# Patient Record
Sex: FEMALE | Race: WHITE
[De-identification: names, ages, dates, MRNs, and addresses within clinical notes are randomized per-mention and may not be internally consistent; named-entity substitution may affect disease eponyms.]

---

## 2018-01-24 ENCOUNTER — HOSPITAL ENCOUNTER (OUTPATIENT)
Dept: HOSPITAL 47 - RADXRMAIN | Age: 48
Discharge: HOME | End: 2018-01-24
Payer: COMMERCIAL

## 2018-01-24 DIAGNOSIS — M25.571: Primary | ICD-10-CM

## 2018-01-24 NOTE — XR
EXAMINATION TYPE: XR ankle complete RT, XR foot complete RT

 

DATE OF EXAM: 1/24/2018

 

CLINICAL HISTORY: Right ankle and foot pain since this morning.

 

TECHNIQUE:  Frontal, lateral and oblique images of the right ankle and foot are obtained.

 

COMPARISON: None.

 

FINDINGS:  There is no acute fracture/dislocation evident in the right ankle.  The ankle mortise appe
ars within normal limits. Moderate diffuse subcutaneous edema is felt present.

 

There is no acute fracture or dislocation evident in the right foot. There is moderate size inferior 
calcaneal spur. There is mild spurring dorsal surface at hind to midfoot level.  Overlying soft tissu
e is unremarkable.  

 

IMPRESSION:  There is no acute fracture or dislocation in the right ankle or foot.

## 2018-02-12 ENCOUNTER — HOSPITAL ENCOUNTER (OUTPATIENT)
Dept: HOSPITAL 47 - 3OBS | Age: 48
Setting detail: OBSERVATION
LOS: 1 days | Discharge: HOME | End: 2018-02-13
Payer: COMMERCIAL

## 2018-02-12 VITALS — RESPIRATION RATE: 16 BRPM

## 2018-02-12 DIAGNOSIS — G95.19: ICD-10-CM

## 2018-02-12 DIAGNOSIS — Z79.899: ICD-10-CM

## 2018-02-12 DIAGNOSIS — M48.02: ICD-10-CM

## 2018-02-12 DIAGNOSIS — F32.9: ICD-10-CM

## 2018-02-12 DIAGNOSIS — D72.829: ICD-10-CM

## 2018-02-12 DIAGNOSIS — Z83.3: ICD-10-CM

## 2018-02-12 DIAGNOSIS — M50.322: ICD-10-CM

## 2018-02-12 DIAGNOSIS — I10: ICD-10-CM

## 2018-02-12 DIAGNOSIS — Z82.49: ICD-10-CM

## 2018-02-12 DIAGNOSIS — M50.222: ICD-10-CM

## 2018-02-12 DIAGNOSIS — R51: Primary | ICD-10-CM

## 2018-02-12 DIAGNOSIS — E66.9: ICD-10-CM

## 2018-02-12 DIAGNOSIS — R20.2: ICD-10-CM

## 2018-02-12 DIAGNOSIS — R73.9: ICD-10-CM

## 2018-02-12 DIAGNOSIS — F41.9: ICD-10-CM

## 2018-02-12 LAB
ALBUMIN SERPL-MCNC: 4.5 G/DL (ref 3.5–5)
ALP SERPL-CCNC: 94 U/L (ref 38–126)
ALT SERPL-CCNC: 33 U/L (ref 9–52)
ANION GAP SERPL CALC-SCNC: 14 MMOL/L
AST SERPL-CCNC: 19 U/L (ref 14–36)
BASOPHILS # BLD AUTO: 0.1 K/UL (ref 0–0.2)
BASOPHILS NFR BLD AUTO: 1 %
BUN SERPL-SCNC: 16 MG/DL (ref 7–17)
CALCIUM SPEC-MCNC: 9.4 MG/DL (ref 8.4–10.2)
CHLORIDE SERPL-SCNC: 102 MMOL/L (ref 98–107)
CO2 SERPL-SCNC: 27 MMOL/L (ref 22–30)
EOSINOPHIL # BLD AUTO: 0.1 K/UL (ref 0–0.7)
EOSINOPHIL NFR BLD AUTO: 1 %
ERYTHROCYTE [DISTWIDTH] IN BLOOD BY AUTOMATED COUNT: 5.25 M/UL (ref 3.8–5.4)
ERYTHROCYTE [DISTWIDTH] IN BLOOD: 13 % (ref 11.5–15.5)
GLUCOSE SERPL-MCNC: 121 MG/DL (ref 74–99)
HCT VFR BLD AUTO: 45.5 % (ref 34–46)
HGB BLD-MCNC: 14.7 GM/DL (ref 11.4–16)
LYMPHOCYTES # SPEC AUTO: 3.4 K/UL (ref 1–4.8)
LYMPHOCYTES NFR SPEC AUTO: 26 %
MCH RBC QN AUTO: 28 PG (ref 25–35)
MCHC RBC AUTO-ENTMCNC: 32.3 G/DL (ref 31–37)
MCV RBC AUTO: 86.5 FL (ref 80–100)
MONOCYTES # BLD AUTO: 0.5 K/UL (ref 0–1)
MONOCYTES NFR BLD AUTO: 4 %
NEUTROPHILS # BLD AUTO: 8.6 K/UL (ref 1.3–7.7)
NEUTROPHILS NFR BLD AUTO: 66 %
PH UR: 6 [PH] (ref 5–8)
PLATELET # BLD AUTO: 342 K/UL (ref 150–450)
POTASSIUM SERPL-SCNC: 3.8 MMOL/L (ref 3.5–5.1)
PROT SERPL-MCNC: 7.6 G/DL (ref 6.3–8.2)
SODIUM SERPL-SCNC: 143 MMOL/L (ref 137–145)
SP GR UR: 1.01 (ref 1–1.03)
UROBILINOGEN UR QL STRIP: <2 MG/DL (ref ?–2)
WBC # BLD AUTO: 13 K/UL (ref 3.8–10.6)

## 2018-02-12 PROCEDURE — 96361 HYDRATE IV INFUSION ADD-ON: CPT

## 2018-02-12 PROCEDURE — 70450 CT HEAD/BRAIN W/O DYE: CPT

## 2018-02-12 PROCEDURE — 72141 MRI NECK SPINE W/O DYE: CPT

## 2018-02-12 PROCEDURE — 81003 URINALYSIS AUTO W/O SCOPE: CPT

## 2018-02-12 PROCEDURE — 85652 RBC SED RATE AUTOMATED: CPT

## 2018-02-12 PROCEDURE — 93306 TTE W/DOPPLER COMPLETE: CPT

## 2018-02-12 PROCEDURE — 85025 COMPLETE CBC W/AUTO DIFF WBC: CPT

## 2018-02-12 PROCEDURE — 86038 ANTINUCLEAR ANTIBODIES: CPT

## 2018-02-12 PROCEDURE — 80053 COMPREHEN METABOLIC PANEL: CPT

## 2018-02-12 PROCEDURE — 83036 HEMOGLOBIN GLYCOSYLATED A1C: CPT

## 2018-02-12 PROCEDURE — 70551 MRI BRAIN STEM W/O DYE: CPT

## 2018-02-12 PROCEDURE — 84443 ASSAY THYROID STIM HORMONE: CPT

## 2018-02-12 PROCEDURE — 96374 THER/PROPH/DIAG INJ IV PUSH: CPT

## 2018-02-12 RX ADMIN — VENLAFAXINE HYDROCHLORIDE SCH MG: 75 TABLET ORAL at 20:18

## 2018-02-12 RX ADMIN — ATENOLOL SCH MG: 25 TABLET ORAL at 20:18

## 2018-02-12 RX ADMIN — CEFAZOLIN SCH MLS/HR: 330 INJECTION, POWDER, FOR SOLUTION INTRAMUSCULAR; INTRAVENOUS at 11:00

## 2018-02-12 NOTE — CT
EXAMINATION TYPE: CT brain wo con

 

DATE OF EXAM: 2/12/2018

 

COMPARISON: NONE

 

HISTORY: Tingling sensation from back of head to shoulders x 3 days.

 

CT DLP: 1005.9 mGycm.  Automated Exposure Control for Dose Reduction was Utilized.

 

 

TECHNIQUE: CT scan of the head is performed without contrast.

 

FINDINGS:   Ventricles of normal size. There is no mass effect nor midline shift. There is no sign of
 intracranial hemorrhage. The calvarium is intact.

 

CONCLUSION:

Negative CT scan of the brain.

## 2018-02-13 VITALS — DIASTOLIC BLOOD PRESSURE: 65 MMHG | TEMPERATURE: 98 F | HEART RATE: 62 BPM | SYSTOLIC BLOOD PRESSURE: 139 MMHG

## 2018-02-13 LAB — HBA1C MFR BLD: 5.3 % (ref 4–6)

## 2018-02-13 RX ADMIN — CEFAZOLIN SCH: 330 INJECTION, POWDER, FOR SOLUTION INTRAMUSCULAR; INTRAVENOUS at 16:50

## 2018-02-13 RX ADMIN — ATENOLOL SCH MG: 25 TABLET ORAL at 09:56

## 2018-02-13 RX ADMIN — VENLAFAXINE HYDROCHLORIDE SCH MG: 75 TABLET ORAL at 09:55

## 2018-02-13 NOTE — CONS
CONSULTATION



DATE OF CONSULTATION:

02/12/2018



CHIEF COMPLAINT:

Headache



HISTORY OF PRESENT ILLNESS:

Mrs. Swanson is a pleasant 47-year-old,  female, who is being evaluated today on

02/12/2018 by the neurology service per the request of Dr. Jewel Lopez for a

headache.  The patient states that she was having a severe headache all day Friday

which she described as a pressure pain that was pretty much generalized and she rated

it as severe as 8/10 in intensity.  She denies any history of migraines, but has 1 to 2

headaches per year that she describes as a common headache.  She denied any fevers and

denies any recent travel.  On Saturday and Sunday she did not have any headache but

kept having tingling sensation in her scalp and posterior head region that would come

and go throughout the day.  She described it as a pins and needle sensation.  She works

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

slightly elevated and she was admitted to the observation unit for further workup and

management.  At the time of my evaluation, she denies any headache but still has

recurrent episodes of pins and needles sensation involving her scalp bilaterally.  A CT

scan of the brain was done today, which was normal.  Her CBC showed mild leukocytosis

at 13.0.  Her chemistry profile was normal except for mild hyperglycemia at 121.  The

patient was not fasting for that test.  Her TSH and urinalysis were normal.



PAST MEDICAL HISTORY:

Hypertension, depression, anxiety disorder.



SOCIAL HISTORY:

She denies any tobacco alcohol or drug use.



FAMILY HISTORY:

Noncontributory.



HOME MEDICATIONS:

Reviewed in the chart.



ALLERGIES:

No known drug allergies.



REVIEW OF SYSTEMS:

CONSTITUTIONAL:  Negative.

EYES:  Negative.

ENT:  Negative.

CARDIOVASCULAR:  As mentioned above.

RESPIRATORY:  Negative.

NEUROLOGICAL:  She denies any lateralizing weakness. Also, as mentioned above.

GASTROINTESTINAL:  Negative.

GENITOURINARY:  Negative.

PSYCHIATRIC:  As mentioned above.

MUSCULOSKELETAL:  Negative.

DERMATOLOGICAL:  Negative.

ENDOCRINE:  Negative.



PHYSICAL EXAM:

Vital signs show a temperature of 98.1, pulse 58, respiration 18, blood pressure

128/61.

GENERAL APPEARANCE:  The patient is a well-developed  female who appears to be

in no acute distress.

HEENT:  Normocephalic, atraumatic, no facial asymmetry is seen. Extraocular muscles are

intact.

NECK:  Supple with no masses felt.

CARDIOVASCULAR:  Regular rate and rhythm.

ABDOMEN:  Nontender, nondistended.

Extremities showed no edema or clubbing.

NEUROLOGICAL EXAM: The patient is alert, aware and oriented x3.  Speech and language

are normal.  Strength is full in all 4 extremities.  Sensory exam was normal to light

touch in all 4 extremities.  Finger-nose-finger testing showed no dysmetria.

Tenderness to palpation is felt along the right greater occipital nerve region.  No

facial asymmetry is seen on cranial nerve testing.



IMPRESSION:

1. Atypical headache.

2. Scalp tingling.

3. Hypertension.



RECOMMENDATION:

The patient did have an atypical headache with no previous history of common headaches.

She is still experiencing tingling that comes and goes involving the scalp bilaterally.

On examination, she was found to have tenderness along the right greater occipital

nerve region.  I will order an MRI of the brain.  I will also order an MRI of the

cervical spine to rule out any abnormalities at the C2-C3 level which could explain her

symptoms.  I reviewed with her the results of her workup thus far including her normal

CT scan of the brain and she was reassured from that standpoint.  I will continue to

follow with you.  Further recommendations to follow.  I do recommend further workup for

her leukocytosis.  She has no nuchal rigidity on my examination and I doubt any

intracranial infection



Thank you, Dr. Lopez, for allowing me to participate in the care of your patient.

If you have any questions, please feel free to contact me.





MMODL / IJN: [id number] / Job#: [id number]

## 2018-02-13 NOTE — ECHOF
Referral Reason:htn



MEASUREMENTS

--------

HEIGHT: 162.6 cm

WEIGHT: 141.1 kg

BP: 

RVIDd:   2.7 cm     (< 3.3)

IVSd:   1.1 cm     (0.6 - 1.1)

LVIDd:   4.6 cm     (3.9 - 5.3)

LVPWd:   1.2 cm     (0.6 - 1.1)

IVSs:   1.5 cm

LVIDs:   3.1 cm

LVPWs:   1.4 cm

LA Diam:   3.3 cm     (2.7 - 3.8)

LAESV Index (A-L):   19.00 ml/m

Ao Diam:   2.8 cm     (2.0 - 3.7)

AV Cusp:   1.7 cm     (1.5 - 2.6)

LA Diam:   3.3 cm     (2.7 - 3.8)

MV EXCURSION:   17.007 mm     (> 18.000)

MV EF SLOPE:   93 mm/s     (70 - 150)

EPSS:   0.2 cm

MV E Bharat:   0.76 m/s

MV DecT:   179 ms

MV A Bharat:   0.83 m/s

MV E/A Ratio:   0.91 

RAP:   5.00 mmHg







FINDINGS

--------

Sinus rhythm.

This was a technically adequate study.   Morbid Obesity

The left ventricular size is normal.   There is borderline concentric left ventricular hypertrophy.  
 Overall left ventricular systolic function is normal with, an EF between 55 - 60 %.

The right ventricle is normal in size.

The left atrial size is normal.

The right atrial size is normal.

The aortic valve is trileaflet, and appears structurally normal. No aortic stenosis or regurgitation.


The mitral valve is normal.   Mild mitral regurgitation is present.

Mild tricuspid regurgitation present.   There is no evidence of pulmonary hypertension.   The right v
entricular systolic pressure, as measured by Doppler, is {RVSP}.

The pulmonic valve was not well visualized.   There is no pulmonic regurgitation present.

The aortic root size is normal.

There is no pericardial effusion.



CONCLUSIONS

--------

1. Sinus rhythm.

2. This was a technically adequate study.

3. Morbid Obesity

4. The left ventricular size is normal.

5. There is borderline concentric left ventricular hypertrophy.

6. Overall left ventricular systolic function is normal with, an EF between 55 - 60 %.

7. The left atrial size is normal.

8. The aortic valve is trileaflet, and appears structurally normal. No aortic stenosis or regurgitati
on.

9. Mild mitral regurgitation is present.

10. Mild tricuspid regurgitation present.

11. There is no evidence of pulmonary hypertension.

12. The pulmonic valve was not well visualized.

13. There is no pulmonic regurgitation present.

14. The aortic root size is normal.

15. There is no pericardial effusion.





SONOGRAPHER: Amanda Garg RDCS

## 2018-02-13 NOTE — CONS
CONSULTATION



Mrs. Swanson is a 47-year-old female with known history of hypertension, who presented to

Dr. Lopez's office with symptoms of headache and tingling in the head.  The patient

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  I

felt this severe headache that is unusual for her, was not associated with any chest

pain or dyspnea.  No palpitation or syncope.  She checked her blood pressure and has

been under good control.  She has been more active physically, has been exercising and

has a  and has lost about 30 pounds with adjustment of eating habit and

activity level and has no associated symptoms.  She has no prior cardiac history.  She

has no history of arrhythmia. Her coronary risk factors positive for hypertension.

There is no history of diabetes.  She is a nonsmoker.  No documented history of

hyperlipidemia.



MEDICATION:

Her medications at home included amlodipine 5 mg twice a day, Effexor 75 mg twice a

day, hydrochlorothiazide 25 mg daily and atenolol 25 mg twice a day.



REVIEW OF SYSTEMS:

RESPIRATORY SYSTEM: She has no recent wheezing or cough.  No history of obstructive

lung disease.

GI SYSTEM: No recent GI bleeding.  No peptic ulcer disease.

 SYSTEM: No dysuria or hematuria.

NERVOUS SYSTEM: No history of stroke or seizure.

She has history of anxiety.



PHYSICAL EXAMINATION:

A 47-year-old female, alert, oriented, overweight, no acute distress.  Blood pressure

117/60 with the heart rate in the 60s.

HEAD:  Normocephalic.

EYES: Sclerae anicteric.

NECK: Good carotid upstroke. No bruit. No jugular venous distention.

LUNGS: Clear to auscultation.

HEART:  Regular rate and rhythm. S1, S2.  No S3, no S4.  No murmur or rub.

ABDOMEN:  Soft, nontender.  Positive bowel sounds. No organomegaly.  Obese.

EXTREMITIES:  No edema.  Intact distal pulses.



LAB DATA:

Labs data revealed BUN and creatinine of 16 and 0.7. Potassium 3.8. Hemoglobin 14.7.

TSH of 2.18.



Brain CT revealed no evidence of acute changes.



IMPRESSION:

1. Headache, etiology unclear.  Workup in progress.

2. History of hypertension, under good control.



RECOMMENDATION:

From the cardiac standpoint, I see no evidence of acute changes.  I will recommend to

obtain an echocardiogram with Doppler.  Increase her activity. From the cardiac

standpoint, she should be able to be discharged home and depending on her blood

pressure further adjustment of her antihypertensive regimen can be made.



Thank you for this consult.  We will follow with you.





FLAVIO / REID: [id number] / Job#: [id number]

## 2018-02-13 NOTE — P.CNOR
History of Present Illness





- [facility]


Consult date: 18


Requesting physician: Jewel Lopez


Consult reason: other (C5-6 large herniated nucleus pulposus with mass effect 

on the spinal cord)


History of present illness: 





Patient is a very pleasant 47-year-old female who is seen and examined at the 

bedside after consultation was placed by Dr. Jewel Lopez after significant 

findings were found on a cervical MRI.  Patient had been experiencing some 

posterior cervical headaches with tingling sensation over her head and face 

radiating down towards her shoulders that have been ongoing since Friday, 2018.  She had not experienced these types of headaches previously.  She denies 

any accidents, falls, or injuries.  After not having improvement in her symptoms

, she was directly admitted by Dr. Jewel Lopez yesterday, 2018.  She 

was also having some mildly elevated blood pressure at that time.  She has been 

seen and examined by Dr. Randall, who ordered the brain and cervical MRI for 

atypical headaches.  We were consulted following the MRI results.  Patient has 

been seen and examined today by neurology who is not currently planning for 

further treatment.  No acute findings were found in regards to her brain MRI.  

She has been clear for discharge from a neurology standpoint and suggests 

outpatient evaluation on a as-needed basis.  Patient states since admission her 

symptoms have been improving.  She is comfortably sitting in bed without 

difficulty.  She states she is not currently experiencing any significant upper 

extremity radiculopathy or weakness bilaterally.  She has active forward range 

of motion of cervical spine without difficulty.  She does to some posterior 

cervical pain radiating up to the occiput.  Her tingling sensation over her 

head and face has improved as well.  She states she is not currently 

experiencing any pain.





Past Medical History


Past Medical History: Hypertension


History of Any Multi-Drug Resistant Organisms: None Reported


Past Surgical History:  Section, Hysterectomy


Past Anesthesia/Blood Transfusion Reactions: No Reported Reaction


Past Psychological History: Anxiety


Smoking Status: Never smoker





- Past Family History


  ** Mother


Family Medical History: Hypertension, Thyroid Disorder





  ** Father


Family Medical History: Diabetes Mellitus, Hyperlipidemia, Hypertension, 

Thyroid Disorder





  ** Sister(s)


Family Medical History: Renal Disease


Additional Family Medical History / Comment(s): sister  2008 r/t mva

, renal failure





Medications and Allergies


 Home Medications











 Medication  Instructions  Recorded  Confirmed  Type


 


Atenolol 25 mg PO BID 18 History


 


Hydrochlorothiazide [Hydrodiuril] 25 mg PO DAILY 18 History


 


Multivitamins, Thera [Multivitamin 1 tab PO DAILY 18 History





(formulary)]    


 


Venlafaxine HCl [Effexor] 75 mg PO BID 18 History


 


amLODIPine BESYLATE [Norvasc] 5 mg PO BID 18 History


 


clonazePAM [KlonoPIN] 0.5 mg PO BID PRN 18 History











 Allergies











Allergy/AdvReac Type Severity Reaction Status Date / Time


 


No Known Allergies Allergy   Verified 18 11:05














Physical Examination





Physical exam:


Patient is awake, alert, and oriented 3


Vital signs stable


Good chest excursion with deep inspiration and expiration


Abdomen soft nontender


Examination of the cervical spine reveals skin is intact with no abrasions, 

lacerations, or bruises; no erythema, purulence or signs of infection


Mild posterior cervical pain and approximately C7


No significant pain with palpation over the paraspinal muscles


Full range of motion of the cervical spine with adequate flexion, extension, 

and bilateral rotation


 strength, thumb strength, interosseous strength, biceps strength, triceps 

strength, and shoulder strength positive sustained bilaterally


Upper extremity strength 5/5 bilaterally


Biceps reflex 1+ bilaterally and Brachioradialis reflexes 1+ bilaterally


No upper extremity hyperreflexia bilaterally


Hoffmans sign negative upper extremity bilaterally





Results





Pertinent studies:


MRI of the brain and cervical spine: No evidence of recent infarct; no 

suspicious findings to account for patient's symptoms in regards to brain 

imaging; C5-6 degenerative disc disease with anterior osteophytic spurring and 

a large posterior disc herniation resulting in mass effect on the spinal cord 

with suspected edema;  C3-4 left paracentral disc protrusion resulting left 

neural foraminal narrowing; C6-7 central disc protrusion with anterior thecal 

sac compression





- Labs


Labs: 


 H & H











  18 Range/Units





  10:57 


 


Hgb  14.7  (11.4-16.0)  gm/dL


 


Hct  45.5  (34.0-46.0)  %











Result Diagrams: 


 18 10:57





 18 10:57





Assessment and Plan


Assessment: 





Assessment:


C5-6 degenerative disc disease and large herniated nucleus pulposus resulting 

in mass effect on the spinal cord with central spinal stenosis and suspected 

edema


Cervicalgia


Posterior cervical headaches


(1) Herniated nucleus pulposus, C5-6


Current Visit: Yes   Status: Acute   Code(s): M50.222 - OTHER CERVICAL DISC 

DISPLACEMENT AT C5-C6 LEVEL   SNOMED Code(s): 86824034


   





(2) Cervical stenosis of spinal canal


Current Visit: Yes   Status: Chronic   Code(s): M48.02 - SPINAL STENOSIS, 

CERVICAL REGION   SNOMED Code(s): 97592611


   





(3) Cervicalgia


Current Visit: Yes   Status: Chronic   Code(s): M54.2 - CERVICALGIA   SNOMED 

Code(s): 02658397


   





(4) Edema of spinal cord


Current Visit: Yes   Status: Suspected   Code(s): G95.19 - OTHER VASCULAR 

MYELOPATHIES   SNOMED Code(s): 39873521


   





(5) Headache


Current Visit: Yes   Status: Resolved   Code(s): R51 - HEADACHE   SNOMED Code(s)

: 13139623


   


Plan: 





Plan:


1.  Patient has been discussed in detail with Dr. Emeterio Alcantara.  Imaging has been 

reviewed with Dr. Emeterio Alcantara.  After reviewing of imaging, physical examination 

of the patient, and further discussion patient, we will currently plan to 

continue with conservative treatment.  At this time patient is neurovascularly 

intact to the bilateral upper extremities.  She has active full range of motion 

of the bilateral upper extremities without difficulty.  She is not currently 

experiencing any upper extremity weakness or radiculopathy bilaterally.  She is 

not currently complaining of significant cervical pain.  She has no evidence of 

upper extremity radiculopathy.  She has a negative Arnel sign bilaterally.  

She does have some posterior cervical pain that is mild with some posterior 

cervical headaches that have improved since her admission.  We discussed she 

does have a significant disc herniation at C5-6 but she is not currently 

experiencing significant symptoms in regards to this disc herniation.  At this 

time, from an orthopedic spine standpoint, patient is clear for discharge.  We 

will plan to continue following the patient closely in the outpatient setting 

for further evaluation and treatment.  We discussed that if she has a 

significant exacerbation of her symptoms or she starts to experience new onset 

symptoms, we'll discuss further treatment options including possibility of 

physical therapy, consultation with pain management and/or possibly surgical 

intervention.  Patient feels this is a good plan of care.  We discussed patient 

may continue participating in activities tolerance.


2.  Medicine to continue following the patient closely


3.  Following discharge, patient is follow-up with Gene Aleman PA-C or Dr. Emeterio Alcantara at [facility] in approximately 2-3 weeks 

for further evaluation


4.  Patient has been discussed in detail with Dr. Emeterio Alcantara and he agrees with 

this plan


Time with Patient: Greater than 30

## 2018-02-13 NOTE — P.PN
Subjective


Progress Note Date: 02/13/18


Principal diagnosis: 





Headache





Is a pleasant 47 her  female continuing to be evaluated by the 

neurology service for headache.  On Friday she was having a severe headache 

which she described as generalized and pressure type with an 8 out of 10 

intensity.  She had no significant history of migraine headaches and has about 

one to 2, type headaches.  The following 2 days she noticed a tingling 

sensation of her scalp in the occipital region.  It was intermittent.  Her 

blood pressure was found to be slightly elevated and she was admitted for 

observation.  She continues to deny any headache but is still having an 

occasional episode of the pins and needle sensation of her scalp.  Initial CT 

was normal.  An MRI of the brain and cervical spine were performed.  MRI of the 

brain showed no evidence of recent infarct.  There were no suspicious findings 

seen to account for patient's symptoms.  There was no significant white matter 

abnormality.  MRI of the cervical spine showed some multilevel disc 

displacement.  Most prominent was a disc herniation at the C5-C6 level causing 

mass effect on the spinal cord was suspected edema.  Neurosurgical consult has 

been placed.  She denies any significant radicular symptoms in the 

corresponding dermatomal pattern.





Objective





- Vital Signs


Vital signs: 


 Vital Signs











Temp  97.8 F   02/13/18 11:43


 


Pulse  65   02/13/18 11:43


 


Resp  16   02/13/18 11:43


 


BP  116/58   02/13/18 11:43


 


Pulse Ox  96   02/13/18 11:43








 Intake & Output











 02/12/18 02/13/18 02/13/18





 18:59 06:59 18:59


 


Intake Total 596  236


 


Balance 596  236


 


Weight 141.2 kg  


 


Intake:   


 


  Oral 596  236


 


Other:   


 


  Voiding Method  Toilet Toilet














- Constitutional


General appearance: Present: cooperative, no acute distress





- EENT


Eyes: Present: EOMI, PERRLA.  Absent: abnormal pupil, ptosis


ENT: Present: hearing grossly normal





- Neck


Neck: Present: normal ROM.  Absent: lymphadenopathy





- Respiratory


Respiratory: negative: prolonged expiration, prolonged inspiration





- Cardiovascular


Rhythm: regular





- Gastrointestinal


General gastrointestinal: Absent: distended, tenderness





- Neurologic


Neurologic Comment(s): 





She is alert awake and oriented 3.  Speech and language normal.  Strength is 

full in all 4 extremities.  There is no sensory deficit.  There is no 

dysmetria.  There is no tremors or seizure-like activity seen.  She continues 

to have some tenderness to palpation on the right greater occipital nerve.  

There is no facial asymmetry.





- Labs


CBC & Chem 7: 


 02/12/18 10:57





 02/12/18 10:57





Assessment and Plan


(1) Headache


Current Visit: Yes   Status: Resolved   Code(s): R51 - HEADACHE   SNOMED Code(s)

: [id number]


   





(2) Sensory disturbance


Current Visit: Yes   Status: Acute   Code(s): R20.9 - UNSPECIFIED DISTURBANCES 

OF SKIN SENSATION   SNOMED Code(s): 05922767


   





(3) Cervicalgia


Current Visit: Yes   Status: Chronic   Code(s): M54.2 - CERVICALGIA   SNOMED 

Code(s): 61797386


   





(4) Cervical disc displacement


Current Visit: Yes   Status: Chronic   Code(s): M50.20 - OTHER CERVICAL DISC 

DISPLACEMENT, UNSP CERVICAL REGION   SNOMED Code(s): 016691338


   





(5) Cervical stenosis of spinal canal


Current Visit: Yes   Status: Chronic   Code(s): M48.02 - SPINAL STENOSIS, 

CERVICAL REGION   SNOMED Code(s): 25648673


   





(6) Edema of spinal cord


Current Visit: Yes   Status: Suspected   Code(s): G95.19 - OTHER VASCULAR 

MYELOPATHIES   SNOMED Code(s): 14076303


   





(7) Hypertension


Current Visit: Yes   Status: Chronic   Code(s): I10 - ESSENTIAL (PRIMARY) 

HYPERTENSION   SNOMED Code(s): 56605264


   


Plan: 





Her headache is resolved and she has no alarming neurological symptoms related 

to this.  Due to her significant finding on cervical spine MRI, a neurosurgical 

consultation was placed.  Recommendations will be made by Dr. Aclantara.  There was 

an incidental finding of a thyroid nodule on her MRI.  Recommend follow-up in 

an outpatient setting for nonurgent ultrasound of the thyroid.  Otherwise she 

is cleared from a neurological standpoint.  We can be consulted on as-needed 

basis for any questions or changes in her neurological status.





I have performed a history and physical on the above patient.  I have reviewed 

the above note, and agree.

## 2018-02-13 NOTE — MR
EXAMINATION TYPE: MR brain/cspine wo

 

DATE OF EXAM: 2/13/2018

 

COMPARISON: CT brain from yesterday

 

HISTORY: Atypical headache and neck pain. Tingling sensation into shoulders for 3 days.

 

TECHNIQUE: Multiplanar, multisequence imaging of the cervical spine, brain, and brainstem are all per
formed without IV contrast.

 

FINDINGS:  

 

BRAIN: Some motion artifact degradation is present.

 

Diffusion weighted images demonstrate no evidence of a recent infarct or other diffusion abnormality.


 

There is no extraaxial fluid collection or significant white matter signal abnormality.  The ventricu
lar system and cisternal spaces are normal in size and appearance.  The brain volume is age appropria
te.

 

Midline structures demonstrate normal morphology.  The craniocervical junction appears within normal 
limits. Normal vascular flow voids are present. The visualized sinuses are clear and the globes are i
ntact.

 

IMPRESSION: No evidence of a recent infarct. No suspicious finding is seen to account for patient's s
ymptoms. Some motion artifact degradation is present.

 

C-SPINE:

 

FINDINGS: Sagittal images of the cervical spine show the craniocervical junction to appear within nor
mal limits.  Vertebral alignment is straightened.  There is mild to moderate disc space narrowing C5-
C6 level with moderate anterior spurring. There is mild disc space narrowing with moderate anterior s
purring C6-C7 level. Large posterior disc herniation is seen C5-C6 level on sagittal images effacing 
anterior thecal sac and causing AP diameter narrowing of spinal cord. Possible faint increased signal
 is seen at this level on sagittal images. The vertebral body and intravertebral disk heights otherwi
se are normal.  The bone marrow signal intensity is within normal limits.

 

Axial images show the C2-C3 level to appear within normal limits.

 

Axial images at C3-C4 level show left paracentral spur disc complex effacing anterolateral thecal sac
 and causing mild bilateral neural foraminal narrowing.

 

Axial images at C4-C5 level show slightly lobulated foraminal disc protrusion mildly effacing anterio
r thecal sac and causing mild bilateral neural foraminal narrowing.

 

Axial images at C5-C6 level show focal central disc protrusion measuring up to 12 mm transversely by 
almost 4 mm AP diameter axial image 22. There is diminished AP diameter of cord which is flattened mo
st prominent on axial image 24 and shows mild diffuse increased signal. Cord is narrowed up to 3.0 mm
 measured on axial image 24 and reexpands to 5.5 mm on axial image 10 inferior to this.

 

Axial images at C6-C7 level show focal right paracentral disc protrusion effacing anterolateral theca
l sac with some mild flattening of ventral surface of cord seen at this level. In addition there is m
arginal left disc protrusion causing moderate left-sided neural foraminal narrowing. Right-sided neur
al foramen is patent.

 

Axial images at C7-T1 level are felt within normal limits.

 

There is 9 mm left posterior heterogeneous thyroid nodule axial image 10 felt most likely present. Ad
vise nonemergent ultrasound follow-up.

 

IMPRESSION: Straightening of cervical spine with multilevel degenerative changes as detailed above. P
rominent disc herniation C5-C6 level is causing mass effect on cord with suspected edema. Advise neur
osurgical consultation.

 

Results communicated to patient's nurse via telephone at time of dictation.

 

A Yellow message has been communicated to Tabitha Randall via the [email or web address] | Critical Result sy
stem on 2/13/2018 11:22 AM, Message ID [id number].

## 2018-03-18 NOTE — HP
HISTORY AND PHYSICAL



CHIEF COMPLAINT:

This is a 47-year-old female with hypertension with atypical symptoms of tingling and

pulsations in her head typical for acute intracranial event at which time she was sent

to the hospital for admission.  She said this started when she was lifting 140 pounds

weight bench pressing.  She has been very active physically with a .

She has history of hypertension and obesity.



MEDICATIONS:

Effexor 75 b.i.d., hydrochlorothiazide 25 daily, atenolol 25 b.i.d.



REVIEW OF SYSTEMS:

A 14-point review of systems negative except for mentioned in HPI.



PHYSICAL EXAM:

A 47-year-old female in no acute distress.  Blood pressure 117 over 60s, heart rate is

60s.

OPHTHALMOLOGICAL:  Pupils equal, round, react to light and accommodation.

NEUROLOGIC:  Alert and oriented x3.

PSYCH:  Fair mood and affect.

GI:  Soft, nontender.

EXTREMITIES:  Intact dorsalis pedis and posterior pulses.



ASSESSMENT:

1. Atypical headache.

2. Hypertension, rule out intracranial process.



Hypertension control will be done and MRI of cervical and brain will be done.  Please

see further orders.  Neurologic workup pending.





MMODL / IJN: [id number] / Job#: [id number]

## 2018-03-19 NOTE — DS
DISCHARGE SUMMARY



SUBJECTIVE:

This 47-year-old white female was admitted was C5-C6 mass effect on the cord, suspected

edema following MRI. Neurosurgical repair was pending.  IV steroids were given to take

down the swelling over the disk. Acute neurologic event like a stroke or hypertensive

emergency was ruled out.  Neurology saw her, Cardiology saw her. She was stabilized.

She will follow up as an outpatient and with the neurosurgeon for surgery. Weight

restrictions were given to her.



HOME MEDICATIONS:

1. HydroDIURIL 25 mg daily.

2. Klonopin 0.5 mg b.i.d.

3. Amlodipine 5 mg b.i.d.

4. Atenolol 25 b.i.d.

5. Venlafaxine 75 mg b.i.d.

6. Multivitamin daily.





MMODL / MEENUN: [id number] / Job#: [id number]